# Patient Record
Sex: MALE | Race: ASIAN | NOT HISPANIC OR LATINO
[De-identification: names, ages, dates, MRNs, and addresses within clinical notes are randomized per-mention and may not be internally consistent; named-entity substitution may affect disease eponyms.]

---
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
## 2021-01-14 ENCOUNTER — NON-APPOINTMENT (OUTPATIENT)
Age: 21
End: 2021-01-14

## 2021-01-15 ENCOUNTER — APPOINTMENT (OUTPATIENT)
Dept: INTERNAL MEDICINE | Facility: CLINIC | Age: 21
End: 2021-01-15
Payer: COMMERCIAL

## 2021-01-15 DIAGNOSIS — Z83.3 FAMILY HISTORY OF DIABETES MELLITUS: ICD-10-CM

## 2021-01-15 PROCEDURE — 99442: CPT

## 2021-01-15 RX ORDER — DOXYCYCLINE HYCLATE 100 MG/1
100 TABLET ORAL
Qty: 1 | Refills: 2 | Status: DISCONTINUED | COMMUNITY
Start: 2018-03-16 | End: 2021-01-15

## 2021-01-15 RX ORDER — TRETINOIN 0.25 MG/G
0.03 CREAM TOPICAL
Qty: 1 | Refills: 3 | Status: DISCONTINUED | COMMUNITY
Start: 2018-03-16 | End: 2021-01-15

## 2021-08-18 ENCOUNTER — NON-APPOINTMENT (OUTPATIENT)
Age: 21
End: 2021-08-18

## 2021-08-18 ENCOUNTER — LABORATORY RESULT (OUTPATIENT)
Age: 21
End: 2021-08-18

## 2021-08-18 ENCOUNTER — APPOINTMENT (OUTPATIENT)
Dept: INTERNAL MEDICINE | Facility: CLINIC | Age: 21
End: 2021-08-18
Payer: COMMERCIAL

## 2021-08-18 VITALS — BODY MASS INDEX: 21.18 KG/M2 | HEIGHT: 69 IN | WEIGHT: 143 LBS

## 2021-08-18 VITALS — DIASTOLIC BLOOD PRESSURE: 72 MMHG | SYSTOLIC BLOOD PRESSURE: 112 MMHG

## 2021-08-18 VITALS — HEART RATE: 73 BPM

## 2021-08-18 DIAGNOSIS — H61.23 IMPACTED CERUMEN, BILATERAL: ICD-10-CM

## 2021-08-18 DIAGNOSIS — H91.90 UNSPECIFIED HEARING LOSS, UNSPECIFIED EAR: ICD-10-CM

## 2021-08-18 PROCEDURE — 93000 ELECTROCARDIOGRAM COMPLETE: CPT

## 2021-08-18 PROCEDURE — 99395 PREV VISIT EST AGE 18-39: CPT | Mod: 25

## 2021-08-18 PROCEDURE — 36415 COLL VENOUS BLD VENIPUNCTURE: CPT

## 2021-08-18 NOTE — PHYSICAL EXAM
[Normal Outer Ear/Nose] : the outer ears and nose were normal in appearance [Normal Oropharynx] : the oropharynx was normal [Normal] : affect was normal and insight and judgment were intact [de-identified] : b/l ears cerumen impaction--hard wax, unable to visualize TMs

## 2021-08-18 NOTE — HISTORY OF PRESENT ILLNESS
[FreeTextEntry1] : needs school form filled/physical [de-identified] : JENNIFER CRAWFORD is a 21 year old male with no significant medical history presents for annual comprehensive medical exam. He feels well today, offers no acute complaints. 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 \par Overall he denies any complaints or concerns, has been well over the pandemic, feels well physically and mentally. \par He keeps active, exercises 4-5x/week--usually weight lifting/strengthening exercises, plays basketball for cardio. \par Ros unremarkable as below.\par Completed COVID19 vaccines in May.

## 2021-08-18 NOTE — HEALTH RISK ASSESSMENT
[No] : In the past 12 months have you used drugs other than those required for medical reasons? No [No falls in past year] : Patient reported no falls in the past year [0] : 2) Feeling down, depressed, or hopeless: Not at all (0) [PHQ-2 Negative - No further assessment needed] : PHQ-2 Negative - No further assessment needed [HIV test declined] : HIV test declined [Hepatitis C test declined] : Hepatitis C test declined [None] : None [With Family] : lives with family [Employed] : employed [Single] : single [Feels Safe at Home] : Feels safe at home [Fully functional (bathing, dressing, toileting, transferring, walking, feeding)] : Fully functional (bathing, dressing, toileting, transferring, walking, feeding) [Fully functional (using the telephone, shopping, preparing meals, housekeeping, doing laundry, using] : Fully functional and needs no help or supervision to perform IADLs (using the telephone, shopping, preparing meals, housekeeping, doing laundry, using transportation, managing medications and managing finances) [Reports changes in vision] : Reports changes in vision [Reports normal functional visual acuity (ie: able to read med bottle)] : Reports normal functional visual acuity [Smoke Detector] : smoke detector [Carbon Monoxide Detector] : carbon monoxide detector [Safety elements used in home] : safety elements used in home [Seat Belt] :  uses seat belt [Sunscreen] : uses sunscreen [] : No [de-identified] : none [de-identified] : as above [de-identified] : generally healthy--no sugar. Has lean protein, veggies/fruits.  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

## 2021-08-18 NOTE — PLAN
[FreeTextEntry1] : #Impacted cerumen: hard wax noted b/l--advised on OTC Debrox, if unable to clear with debrox, to return for irrigation\par Patient signed release for pediatrician's office to send immunization records. \par Physical exam grossly normal. \par Will f/u labs, form to be completed once labs and immunization records available.\par Health diet/exercise reviewed.\par

## 2021-08-25 ENCOUNTER — NON-APPOINTMENT (OUTPATIENT)
Age: 21
End: 2021-08-25

## 2021-08-25 LAB
25(OH)D3 SERPL-MCNC: 14.3 NG/ML
ALBUMIN SERPL ELPH-MCNC: 4.8 G/DL
ALP BLD-CCNC: 118 U/L
ALT SERPL-CCNC: 20 U/L
ANION GAP SERPL CALC-SCNC: 17 MMOL/L
APPEARANCE: ABNORMAL
AST SERPL-CCNC: 23 U/L
BASOPHILS # BLD AUTO: 0.02 K/UL
BASOPHILS NFR BLD AUTO: 0.3 %
BILIRUB SERPL-MCNC: 0.5 MG/DL
BILIRUBIN URINE: NEGATIVE
BLOOD URINE: NEGATIVE
BUN SERPL-MCNC: 22 MG/DL
CALCIUM SERPL-MCNC: 9.9 MG/DL
CHLORIDE SERPL-SCNC: 104 MMOL/L
CHOLEST SERPL-MCNC: 186 MG/DL
CO2 SERPL-SCNC: 23 MMOL/L
COLOR: ABNORMAL
CREAT SERPL-MCNC: 1.11 MG/DL
EOSINOPHIL # BLD AUTO: 0.1 K/UL
EOSINOPHIL NFR BLD AUTO: 1.7 %
ESTIMATED AVERAGE GLUCOSE: 100 MG/DL
GLUCOSE QUALITATIVE U: NEGATIVE
GLUCOSE SERPL-MCNC: 95 MG/DL
HBA1C MFR BLD HPLC: 5.1 %
HBV CORE IGG+IGM SER QL: NONREACTIVE
HBV SURFACE AB SER QL: REACTIVE
HBV SURFACE AG SER QL: NONREACTIVE
HCT VFR BLD CALC: 54.1 %
HDLC SERPL-MCNC: 64 MG/DL
HGB BLD-MCNC: 16.8 G/DL
IMM GRANULOCYTES NFR BLD AUTO: 0.3 %
KETONES URINE: NEGATIVE
LDLC SERPL CALC-MCNC: 112 MG/DL
LEUKOCYTE ESTERASE URINE: NEGATIVE
LYMPHOCYTES # BLD AUTO: 1.7 K/UL
LYMPHOCYTES NFR BLD AUTO: 29.5 %
M TB IFN-G BLD-IMP: NEGATIVE
MAN DIFF?: NORMAL
MCHC RBC-ENTMCNC: 29.4 PG
MCHC RBC-ENTMCNC: 31.1 GM/DL
MCV RBC AUTO: 94.6 FL
MEV IGG FLD QL IA: >300 AU/ML
MEV IGG+IGM SER-IMP: POSITIVE
MONOCYTES # BLD AUTO: 0.54 K/UL
MONOCYTES NFR BLD AUTO: 9.4 %
MUV AB SER-ACNC: POSITIVE
MUV IGG SER QL IA: 121 AU/ML
NEUTROPHILS # BLD AUTO: 3.39 K/UL
NEUTROPHILS NFR BLD AUTO: 58.8 %
NITRITE URINE: NEGATIVE
NONHDLC SERPL-MCNC: 123 MG/DL
PH URINE: 5.5
PLATELET # BLD AUTO: 264 K/UL
POTASSIUM SERPL-SCNC: 4.7 MMOL/L
PROT SERPL-MCNC: 7.8 G/DL
PROTEIN URINE: NEGATIVE
QUANTIFERON TB PLUS MITOGEN MINUS NIL: 7.1 IU/ML
QUANTIFERON TB PLUS NIL: 0.02 IU/ML
QUANTIFERON TB PLUS TB1 MINUS NIL: 0 IU/ML
QUANTIFERON TB PLUS TB2 MINUS NIL: 0 IU/ML
RBC # BLD: 5.72 M/UL
RBC # FLD: 13.4 %
RUBV IGG FLD-ACNC: 1.4 INDEX
RUBV IGG SER-IMP: POSITIVE
SODIUM SERPL-SCNC: 143 MMOL/L
SPECIFIC GRAVITY URINE: 1.02
T4 FREE SERPL-MCNC: 1.3 NG/DL
TRIGL SERPL-MCNC: 52 MG/DL
TSH SERPL-ACNC: 2.4 UIU/ML
UROBILINOGEN URINE: NORMAL
WBC # FLD AUTO: 5.77 K/UL

## 2022-03-21 ENCOUNTER — APPOINTMENT (OUTPATIENT)
Dept: INTERNAL MEDICINE | Facility: CLINIC | Age: 22
End: 2022-03-21
Payer: COMMERCIAL

## 2022-03-21 VITALS
SYSTOLIC BLOOD PRESSURE: 120 MMHG | BODY MASS INDEX: 22.51 KG/M2 | WEIGHT: 152 LBS | HEIGHT: 69 IN | DIASTOLIC BLOOD PRESSURE: 80 MMHG

## 2022-03-21 DIAGNOSIS — L70.0 ACNE VULGARIS: ICD-10-CM

## 2022-03-21 DIAGNOSIS — Z11.59 ENCOUNTER FOR SCREENING FOR OTHER VIRAL DISEASES: ICD-10-CM

## 2022-03-21 PROCEDURE — 99213 OFFICE O/P EST LOW 20 MIN: CPT | Mod: 25

## 2022-03-21 PROCEDURE — 36415 COLL VENOUS BLD VENIPUNCTURE: CPT

## 2022-03-22 ENCOUNTER — NON-APPOINTMENT (OUTPATIENT)
Age: 22
End: 2022-03-22

## 2022-03-22 LAB
APPEARANCE: CLEAR
BACTERIA: NEGATIVE
BILIRUBIN URINE: NEGATIVE
BLOOD URINE: NEGATIVE
COLOR: NORMAL
GLUCOSE QUALITATIVE U: NEGATIVE
HBV CORE IGG+IGM SER QL: NONREACTIVE
HBV SURFACE AB SERPL IA-ACNC: 120.6 MIU/ML
HBV SURFACE AG SER QL: NONREACTIVE
HYALINE CASTS: 0 /LPF
KETONES URINE: NEGATIVE
LEUKOCYTE ESTERASE URINE: NEGATIVE
MEV IGG FLD QL IA: >300 AU/ML
MEV IGG+IGM SER-IMP: POSITIVE
MICROSCOPIC-UA: NORMAL
MUV AB SER-ACNC: POSITIVE
MUV IGG SER QL IA: 124 AU/ML
NITRITE URINE: NEGATIVE
PH URINE: 6
PROTEIN URINE: NEGATIVE
RED BLOOD CELLS URINE: 0 /HPF
RUBV IGG FLD-ACNC: 1.6 INDEX
RUBV IGG SER-IMP: POSITIVE
SPECIFIC GRAVITY URINE: 1.02
SQUAMOUS EPITHELIAL CELLS: 0 /HPF
UROBILINOGEN URINE: NORMAL
VZV AB TITR SER: NORMAL
VZV IGG SER IF-ACNC: 146.2 INDEX
WHITE BLOOD CELLS URINE: 0 /HPF

## 2022-03-25 LAB — BACTERIA UR CULT: NORMAL

## 2023-02-26 ENCOUNTER — NON-APPOINTMENT (OUTPATIENT)
Age: 23
End: 2023-02-26

## 2023-03-01 ENCOUNTER — NON-APPOINTMENT (OUTPATIENT)
Age: 23
End: 2023-03-01

## 2023-03-16 ENCOUNTER — LABORATORY RESULT (OUTPATIENT)
Age: 23
End: 2023-03-16

## 2023-03-16 ENCOUNTER — APPOINTMENT (OUTPATIENT)
Dept: INTERNAL MEDICINE | Facility: CLINIC | Age: 23
End: 2023-03-16
Payer: COMMERCIAL

## 2023-03-16 ENCOUNTER — NON-APPOINTMENT (OUTPATIENT)
Age: 23
End: 2023-03-16

## 2023-03-16 VITALS
DIASTOLIC BLOOD PRESSURE: 70 MMHG | HEART RATE: 79 BPM | RESPIRATION RATE: 14 BRPM | SYSTOLIC BLOOD PRESSURE: 108 MMHG | TEMPERATURE: 98.3 F | OXYGEN SATURATION: 98 %

## 2023-03-16 VITALS — BODY MASS INDEX: 20.88 KG/M2 | HEIGHT: 69 IN | WEIGHT: 141 LBS

## 2023-03-16 PROCEDURE — 93000 ELECTROCARDIOGRAM COMPLETE: CPT

## 2023-03-16 PROCEDURE — 36415 COLL VENOUS BLD VENIPUNCTURE: CPT

## 2023-03-16 PROCEDURE — 99395 PREV VISIT EST AGE 18-39: CPT | Mod: 25

## 2023-03-16 NOTE — PHYSICAL EXAM
[Normal Outer Ear/Nose] : the outer ears and nose were normal in appearance [Normal Oropharynx] : the oropharynx was normal [Normal TMs] : both tympanic membranes were normal [Normal] : no posterior cervical lymphadenopathy and no anterior cervical lymphadenopathy [de-identified] : b/l ears cerumen, nonimpacted, able to visualize TMs [de-identified] : defers full exam, normal bladder palpation, no inguinal hernias

## 2023-03-16 NOTE — HISTORY OF PRESENT ILLNESS
[FreeTextEntry1] : annuak physical [de-identified] : JENNIFER CRAWFORD is a 22 year old male with no significant medical history who presents for annual comprehensive exam. \par Feels well, no acute complaints. 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

## 2023-03-16 NOTE — PLAN
[FreeTextEntry1] : Labs drawn in office today.\par Physical exam grossly normal. \par Health diet/exercise reviewed.\par \par Flu vaccine UTD 10/2022\par COVID19 per patient UTD with latest booster\par Form to be completed.\par

## 2023-03-16 NOTE — HEALTH RISK ASSESSMENT
[Very Good] : ~his/her~  mood as very good [No] : In the past 12 months have you used drugs other than those required for medical reasons? No [No falls in past year] : Patient reported no falls in the past year [0] : 2) Feeling down, depressed, or hopeless: Not at all (0) [PHQ-2 Negative - No further assessment needed] : PHQ-2 Negative - No further assessment needed [HIV test declined] : HIV test declined [Hepatitis C test declined] : Hepatitis C test declined [None] : None [With Family] : lives with family [Employed] : employed [Single] : single [Feels Safe at Home] : Feels safe at home [Fully functional (bathing, dressing, toileting, transferring, walking, feeding)] : Fully functional (bathing, dressing, toileting, transferring, walking, feeding) [Fully functional (using the telephone, shopping, preparing meals, housekeeping, doing laundry, using] : Fully functional and needs no help or supervision to perform IADLs (using the telephone, shopping, preparing meals, housekeeping, doing laundry, using transportation, managing medications and managing finances) [Reports changes in vision] : Reports changes in vision [Reports normal functional visual acuity (ie: able to read med bottle)] : Reports normal functional visual acuity [Smoke Detector] : smoke detector [Carbon Monoxide Detector] : carbon monoxide detector [Safety elements used in home] : safety elements used in home [Seat Belt] :  uses seat belt [Sunscreen] : uses sunscreen [College] : College [Never] : Never [de-identified] : none [de-identified] : as above [de-identified] : generally healthy--no sugar. Has lean protein, veggies/fruits.  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

## 2023-03-22 LAB
25(OH)D3 SERPL-MCNC: 9.5 NG/ML
ALBUMIN SERPL ELPH-MCNC: 4.5 G/DL
ALP BLD-CCNC: 95 U/L
ALT SERPL-CCNC: 29 U/L
ANION GAP SERPL CALC-SCNC: 14 MMOL/L
APPEARANCE: ABNORMAL
AST SERPL-CCNC: 20 U/L
BASOPHILS # BLD AUTO: 0.03 K/UL
BASOPHILS NFR BLD AUTO: 0.5 %
BILIRUB SERPL-MCNC: 0.7 MG/DL
BILIRUBIN URINE: NEGATIVE
BLOOD URINE: NEGATIVE
BUN SERPL-MCNC: 23 MG/DL
CALCIUM SERPL-MCNC: 9.4 MG/DL
CHLORIDE SERPL-SCNC: 103 MMOL/L
CHOLEST SERPL-MCNC: 166 MG/DL
CO2 SERPL-SCNC: 24 MMOL/L
COLOR: ABNORMAL
CREAT SERPL-MCNC: 1.13 MG/DL
EGFR: 94 ML/MIN/1.73M2
EOSINOPHIL # BLD AUTO: 0.17 K/UL
EOSINOPHIL NFR BLD AUTO: 2.8 %
ESTIMATED AVERAGE GLUCOSE: 108 MG/DL
GLUCOSE QUALITATIVE U: NEGATIVE
GLUCOSE SERPL-MCNC: 97 MG/DL
HBA1C MFR BLD HPLC: 5.4 %
HCT VFR BLD CALC: 51.4 %
HDLC SERPL-MCNC: 60 MG/DL
HGB BLD-MCNC: 16.4 G/DL
IMM GRANULOCYTES NFR BLD AUTO: 0.5 %
KETONES URINE: NEGATIVE
LDLC SERPL CALC-MCNC: 91 MG/DL
LEUKOCYTE ESTERASE URINE: NEGATIVE
LYMPHOCYTES # BLD AUTO: 1.79 K/UL
LYMPHOCYTES NFR BLD AUTO: 29.1 %
M TB IFN-G BLD-IMP: NEGATIVE
MAN DIFF?: NORMAL
MCHC RBC-ENTMCNC: 29.7 PG
MCHC RBC-ENTMCNC: 31.9 GM/DL
MCV RBC AUTO: 93.1 FL
MONOCYTES # BLD AUTO: 0.52 K/UL
MONOCYTES NFR BLD AUTO: 8.5 %
NEUTROPHILS # BLD AUTO: 3.61 K/UL
NEUTROPHILS NFR BLD AUTO: 58.6 %
NITRITE URINE: NEGATIVE
NONHDLC SERPL-MCNC: 105 MG/DL
PH URINE: 6
PLATELET # BLD AUTO: 259 K/UL
POTASSIUM SERPL-SCNC: 4.2 MMOL/L
PROT SERPL-MCNC: 6.9 G/DL
PROTEIN URINE: ABNORMAL
QUANTIFERON TB PLUS MITOGEN MINUS NIL: >10 IU/ML
QUANTIFERON TB PLUS NIL: 0.01 IU/ML
QUANTIFERON TB PLUS TB1 MINUS NIL: 0 IU/ML
QUANTIFERON TB PLUS TB2 MINUS NIL: 0.01 IU/ML
RBC # BLD: 5.52 M/UL
RBC # FLD: 12.8 %
SODIUM SERPL-SCNC: 141 MMOL/L
SPECIFIC GRAVITY URINE: 1.03
TRIGL SERPL-MCNC: 70 MG/DL
TSH SERPL-ACNC: 1.62 UIU/ML
UROBILINOGEN URINE: NORMAL
WBC # FLD AUTO: 6.15 K/UL

## 2023-03-22 RX ORDER — ERGOCALCIFEROL 1.25 MG/1
1.25 MG CAPSULE ORAL
Qty: 13 | Refills: 0 | Status: ACTIVE | COMMUNITY
Start: 2023-03-22 | End: 1900-01-01

## 2024-03-25 ENCOUNTER — EMERGENCY (EMERGENCY)
Facility: HOSPITAL | Age: 24
LOS: 1 days | Discharge: HOME | End: 2024-03-25
Attending: STUDENT IN AN ORGANIZED HEALTH CARE EDUCATION/TRAINING PROGRAM | Admitting: STUDENT IN AN ORGANIZED HEALTH CARE EDUCATION/TRAINING PROGRAM
Payer: MEDICAID

## 2024-03-25 VITALS
RESPIRATION RATE: 18 BRPM | OXYGEN SATURATION: 99 % | SYSTOLIC BLOOD PRESSURE: 143 MMHG | HEART RATE: 68 BPM | TEMPERATURE: 98 F | DIASTOLIC BLOOD PRESSURE: 80 MMHG

## 2024-03-25 PROCEDURE — 99283 EMERGENCY DEPT VISIT LOW MDM: CPT

## 2024-03-25 NOTE — ED PROVIDER NOTE - PATIENT PORTAL LINK FT
You can access the FollowMyHealth Patient Portal offered by [facility] by registering at the following website: [email or web address]. By joining [email or web address] portal, you will also be able to view your health information using other applications (apps) compatible with our system.

## 2024-03-25 NOTE — ED PROVIDER NOTE - ATTENDING CONTRIBUTION TO CARE
23-year-old male with no past medical history presents to the ED with ear pain.  Patient notes approximately 1 week of ear fullness associated with runny nose.  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  Symptoms continued so he went to urgent care yesterday.  Provider at that time noted large amount of wax, attempted to remove wax and patient had increasing pain and decreased hearing since that time.  No fever or chills.  No discharge from the ear.  No recent swimming.  Pt is already using the cipro otic drops. Well appearing. No distress. No mastoid bone ttp. Cerumen impaction noted in LT ear canal. TM is very minimally visualized. Unable to determine if the patient has a perforation. No erythema or bulging noted. Ddx include but not limited to cerumen impaction. Continue using the drops. follow up with ent

## 2024-03-25 NOTE — ED PROVIDER NOTE - CLINICAL SUMMARY MEDICAL DECISION MAKING FREE TEXT BOX
23M with ear pain and decreased hearing after direct ear manipulation - suspect small tm perforation. Patient states he is already prescribed fluoroquinolone drops- will refer to ENT for further work-up

## 2024-03-25 NOTE — ED PROVIDER NOTE - PHYSICAL EXAMINATION
CONSTITUTIONAL: Well appearing, awake, alert  EAR: LEFT: no mastoid tenderness. no pain with movement or palpation of pinna. no erythema or induration of exterior ear. Canal with copious wax, small area of Tm seen with no erythema or perforation  ENMT: Airway patent, tolerating secretions.    NECK: Supple. No JVD  EYES: Clear bilaterally, pupils equal, round  CARDIAC: Warm, well-perfused  RESPIRATORY: Equal Chest Rise, no stridor. No respiratory distress.   GASTROINTESTINAL: Non-distended  MUSCULOSKELETAL: No gross joint deformity   NEUROLOGICAL: Alert. Speech Fluent. Attends to conversation and exam  SKIN:  No erythema    PSYCHIATRIC: Normal affect. Cooperative with history and exam

## 2024-03-25 NOTE — ED PROVIDER NOTE - OBJECTIVE STATEMENT
23-year-old male with no past medical history presents to the ED with ear pain.  Patient notes approximately 1 week of ear fullness associated with runny nose.  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  Symptoms continued so he went to urgent care yesterday.  Provider at that time noted large amount of wax, attempted to remove wax and patient had increasing pain and decreased hearing since that time.  No fever or chills.  No discharge from the ear.  No recent swimming

## 2024-03-25 NOTE — ED PROVIDER NOTE - NS ED ROS FT
CONSTITUTIONAL: no fever or chills. No weakness    Eyes: No visual change  · ENMT: + runny nose. no sore throat  · CARDIOVASCULAR: no chest pain   · RESPIRATORY:  no cough or  shortness of breath.  · GASTROINTESTINAL: No abdominal pain,no nausea,  no diarrhea, no vomiting  · GENITOURINARY: no dysuria or increased urinary frequency  · MUSCULOSKELETAL: no back pain    NEURO: No weakness, no numbness/tingling   · ROS STATEMENT: all other ROS negative except as per HPI

## 2024-03-25 NOTE — ED PROVIDER NOTE - NSFOLLOWUPINSTRUCTIONS_ED_ALL_ED_FT
You were seen in the ER for ear pain. It is possible your tympanic membrane is ruptured. DO not put anything in your ear. Continue using the antibiotic drops prescribed.     Take Tylenol 975mg every 8 hours for the next 3 days for pain. Although we are sending you home, no ER evaluation is complete without outpatient follow-up. Follow up with ENT, the hospital will call you for this appointment.. Return to the ER for worsening symptoms, fever, discharge from ear

## 2024-04-01 ENCOUNTER — NON-APPOINTMENT (OUTPATIENT)
Age: 24
End: 2024-04-01

## 2024-04-02 ENCOUNTER — APPOINTMENT (OUTPATIENT)
Dept: INTERNAL MEDICINE | Facility: CLINIC | Age: 24
End: 2024-04-02
Payer: MEDICAID

## 2024-04-02 ENCOUNTER — NON-APPOINTMENT (OUTPATIENT)
Age: 24
End: 2024-04-02

## 2024-04-02 VITALS — WEIGHT: 140 LBS | HEIGHT: 69 IN | BODY MASS INDEX: 20.73 KG/M2

## 2024-04-02 VITALS — TEMPERATURE: 99 F

## 2024-04-02 VITALS
OXYGEN SATURATION: 98 % | SYSTOLIC BLOOD PRESSURE: 100 MMHG | DIASTOLIC BLOOD PRESSURE: 62 MMHG | HEART RATE: 139 BPM | RESPIRATION RATE: 14 BRPM

## 2024-04-02 DIAGNOSIS — Z11.1 ENCOUNTER FOR SCREENING FOR RESPIRATORY TUBERCULOSIS: ICD-10-CM

## 2024-04-02 DIAGNOSIS — J10.1 INFLUENZA DUE TO OTHER IDENTIFIED INFLUENZA VIRUS WITH OTHER RESPIRATORY MANIFESTATIONS: ICD-10-CM

## 2024-04-02 DIAGNOSIS — R00.0 TACHYCARDIA, UNSPECIFIED: ICD-10-CM

## 2024-04-02 DIAGNOSIS — Z00.00 ENCOUNTER FOR GENERAL ADULT MEDICAL EXAMINATION W/OUT ABNORMAL FINDINGS: ICD-10-CM

## 2024-04-02 DIAGNOSIS — E55.9 VITAMIN D DEFICIENCY, UNSPECIFIED: ICD-10-CM

## 2024-04-02 PROCEDURE — 93000 ELECTROCARDIOGRAM COMPLETE: CPT

## 2024-04-02 PROCEDURE — 99395 PREV VISIT EST AGE 18-39: CPT

## 2024-04-02 PROCEDURE — 99214 OFFICE O/P EST MOD 30 MIN: CPT | Mod: 25

## 2024-04-02 RX ORDER — OSELTAMIVIR PHOSPHATE 75 MG/1
75 CAPSULE ORAL TWICE DAILY
Qty: 10 | Refills: 0 | Status: ACTIVE | COMMUNITY
Start: 2024-04-02 | End: 1900-01-01

## 2024-04-02 NOTE — HEALTH RISK ASSESSMENT
[Very Good] : ~his/her~  mood as very good [No] : In the past 12 months have you used drugs other than those required for medical reasons? No [No falls in past year] : Patient reported no falls in the past year [0] : 2) Feeling down, depressed, or hopeless: Not at all (0) [PHQ-2 Negative - No further assessment needed] : PHQ-2 Negative - No further assessment needed [HIV test declined] : HIV test declined [Hepatitis C test declined] : Hepatitis C test declined [None] : None [With Family] : lives with family [Employed] : employed [College] : College [Single] : single [Feels Safe at Home] : Feels safe at home [Fully functional (bathing, dressing, toileting, transferring, walking, feeding)] : Fully functional (bathing, dressing, toileting, transferring, walking, feeding) [Fully functional (using the telephone, shopping, preparing meals, housekeeping, doing laundry, using] : Fully functional and needs no help or supervision to perform IADLs (using the telephone, shopping, preparing meals, housekeeping, doing laundry, using transportation, managing medications and managing finances) [Reports changes in vision] : Reports changes in vision [Reports normal functional visual acuity (ie: able to read med bottle)] : Reports normal functional visual acuity [Smoke Detector] : smoke detector [Carbon Monoxide Detector] : carbon monoxide detector [Safety elements used in home] : safety elements used in home [Seat Belt] :  uses seat belt [Sunscreen] : uses sunscreen [Never] : Never [de-identified] : none [de-identified] : as above [de-identified] : generally healthy--no sugar. Has lean protein, veggies/fruits.  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

## 2024-04-02 NOTE — REVIEW OF SYSTEMS
[Fever] : fever [Fatigue] : fatigue [Cough] : cough [Negative] : Heme/Lymph [Chills] : no chills [Shortness Of Breath] : no shortness of breath [Wheezing] : no wheezing [Dyspnea on Exertion] : not dyspnea on exertion [Joint Pain] : no joint pain [Joint Stiffness] : no joint stiffness [Muscle Weakness] : no muscle weakness [Back Pain] : no back pain

## 2024-04-02 NOTE — PHYSICAL EXAM
[Normal Outer Ear/Nose] : the outer ears and nose were normal in appearance [Normal Oropharynx] : the oropharynx was normal [Normal TMs] : both tympanic membranes were normal [Normal] : affect was normal and insight and judgment were intact [de-identified] : appears dehydrated/sick [de-identified] : defers full exam, normal bladder palpation, no inguinal hernias

## 2024-04-02 NOTE — HISTORY OF PRESENT ILLNESS
[FreeTextEntry1] : annual physical, has flu-like symptoms [de-identified] : JENNIFER CRAWFORD is a 23 year old male with no significant medical history who presents for annual comprehensive exam.  He has had body aches, low grade fever for ~1 day, exposed to his brother who has the flu for the past week. He feels tired, achy. Sporadic cough. NO shortness of breath. He is fasting for Ramadan, as a results admits he has not had much to drink. Feels well, no acute complaints. Has school form that needs to be completed, needs Tb screening. 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

## 2024-04-02 NOTE — PLAN
[FreeTextEntry1] : Influenza A+: discussed need to quarantine until fever-free for 24 hours without meds and symptoms better, encouraged generous oral hydration and consider Tylenol for fever >100.5/aches.Note he is tachycardic in 130s on EKG and exam-- likely due to fever/dehydration. Letter provided for school. Tamiflu ordered.  Routine physical/[facility] Rx provided--he will have done after his symptoms are better.  Form to be completed once lab results available. Health diet/exercise reviewed.  Flu vaccine UTD per patient, he will send email with info. COVID19 per patient UTD with latest booster